# Patient Record
Sex: FEMALE | Race: AMERICAN INDIAN OR ALASKA NATIVE
[De-identification: names, ages, dates, MRNs, and addresses within clinical notes are randomized per-mention and may not be internally consistent; named-entity substitution may affect disease eponyms.]

---

## 2018-02-01 ENCOUNTER — HOSPITAL ENCOUNTER (OUTPATIENT)
Dept: HOSPITAL 5 - OR | Age: 53
Discharge: HOME | End: 2018-02-01
Attending: ORTHOPAEDIC SURGERY
Payer: OTHER GOVERNMENT

## 2018-02-01 VITALS — SYSTOLIC BLOOD PRESSURE: 126 MMHG | DIASTOLIC BLOOD PRESSURE: 77 MMHG

## 2018-02-01 DIAGNOSIS — Z98.890: ICD-10-CM

## 2018-02-01 DIAGNOSIS — M19.012: ICD-10-CM

## 2018-02-01 DIAGNOSIS — M75.52: ICD-10-CM

## 2018-02-01 DIAGNOSIS — Z79.82: ICD-10-CM

## 2018-02-01 DIAGNOSIS — M94.212: ICD-10-CM
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
## 2018-02-01 DIAGNOSIS — S43.492A: ICD-10-CM

## 2018-02-01 DIAGNOSIS — Z79.84: ICD-10-CM
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
## 2018-02-01 DIAGNOSIS — Z98.49: ICD-10-CM

## 2018-02-01 DIAGNOSIS — M06.80: ICD-10-CM

## 2018-02-01 DIAGNOSIS — E11.9: ICD-10-CM

## 2018-02-01 DIAGNOSIS — Z79.899: ICD-10-CM
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
## 2018-02-01 DIAGNOSIS — M75.42: ICD-10-CM

## 2018-02-01 DIAGNOSIS — Z90.710: ICD-10-CM

## 2018-02-01 DIAGNOSIS — I10: ICD-10-CM

## 2018-02-01 DIAGNOSIS — Z90.49: ICD-10-CM

## 2018-02-01 DIAGNOSIS — S46.012A: Primary | ICD-10-CM

## 2018-02-01 DIAGNOSIS — Z90.89: ICD-10-CM

## 2018-02-01 DIAGNOSIS — K21.9: ICD-10-CM

## 2018-02-01 PROCEDURE — 82962 GLUCOSE BLOOD TEST: CPT

## 2018-02-01 PROCEDURE — C1713 ANCHOR/SCREW BN/BN,TIS/BN: HCPCS

## 2018-02-01 NOTE — ANESTHESIA CONSULTATION
Anesthesia Consult and Med Hx


Date of service: 02/01/18





- Airway


Anesthetic Teeth Evaluation: Bridges


ROM Head & Neck: Adequate


Mental/Hyoid Distance: Adequate


Mallampati Class: Class II


Intubation Access Assessment: Probably Good





- Pulmonary Exam


CTA: Yes





- Cardiac Exam


Cardiac Exam: RRR





- Pre-Operative Health Status


ASA Pre-Surgery Classification: ASA3


Proposed Anesthetic Plan: General





- Pulmonary


Hx Smoking: No


Hx Sleep Apnea: Yes (DX SLEEP APNEA , NO CPAP USE.)





- Cardiovascular System


Hx Hypertension: Yes (X 6 YRS)





- Gastrointestinal


Hx Gastroesophageal Reflux Disease: Yes





- Endocrine


Hx Non-Insulin Dependent Diabetes: Yes





- Hematic


Hx Anemia: Yes





- Other Systems


Hx Cancer: No





- Additional Comments


Anesthesia Medical History Comments: h/o fibromyalgia, RA. ( Prednisone 5 mg - 

last took it 1 week ago)'

## 2018-02-01 NOTE — SHORT STAY SUMMARY
Short Stay Documentation





- Allergies and Medications


Current Medications: 


 Allergies





No Known Allergies Allergy (Verified 01/24/18 14:59)


 





 Home Medications











 Medication  Instructions  Recorded  Confirmed  Last Taken  Type


 


Aspirin [Lo-Dose Aspirin EC] 81 mg PO DAILY 01/24/18 01/24/18 1 Week Ago History





    ~01/25/18 


 


Empagliflozin [Jardiance] 10 mg PO DAILY 01/24/18 01/24/18 01/31/18 History


 


Esomeprazole Magnesium [NexIUM] 40 mg PO QDAY 01/24/18 01/24/18 01/31/18 History


 


Folic Acid 0.4 mg PO QDAY 01/24/18 01/24/18 02/01/18 06:00 History


 


Hydrochlorothiazide [HCTZ] 25 mg PO QDAY 01/24/18 01/24/18 02/01/18 06:00 

History


 


Methotrexate Sodium [Trexall] 20 mg PO QWEEK 01/24/18 01/24/18 01/31/18 History


 


Naproxen Sodium [Aleve] 220 mg PO PRN PRN 01/24/18 01/24/18 1 Week Ago History





    ~01/25/18 


 


Prednisone [predniSONE (Henny) ER 5 mg PO QDAY 01/24/18 01/24/18 1 Week Ago 

History





TAB]    ~01/25/18 


 


Pregabalin [Lyrica] 150 mg PO BID 01/24/18 01/24/18 02/01/18 06:00 History


 


metFORMIN [Glucophage] 500 mg PO BID 01/24/18 01/24/18 01/31/18 History








Active Medications





Cefazolin Sodium (Ancef/Sterile Water 2 Gm/20 Ml)  2 gm IV PREOP NR


   Stop: 02/01/18 12:00


Famotidine (Pepcid)  20 mg IV PREOP NR


   Stop: 02/01/18 12:00


   Last Admin: 02/01/18 08:32 Dose:  20 mg


Fentanyl (Sublimaze)  100 mcg IV ONCE NR


   Stop: 02/01/18 12:00


   Last Admin: 02/01/18 08:28 Dose:  50 mcg


Hydromorphone HCl (Dilaudid)  0.5 mg IV Q10MIN PRN


   PRN Reason: Pain , Severe (7-10)


   Stop: 02/01/18 13:00


Sodium Chloride (Nacl 0.9% 1000 Ml)  1,000 mls @ 75 mls/hr IV AS DIRECT ULISSES


   Last Admin: 02/01/18 08:15 Dose:  75 mls/hr


Midazolam HCl (Versed)  2 mg IV PREOP NR


   Stop: 02/01/18 23:59


   Last Admin: 02/01/18 08:28 Dose:  1 mg


Oxycodone/Acetaminophen (Percocet 5/325)  1 tab PO ONCE PRN


   PRN Reason: Pain, Moderate (4-6)


   Stop: 02/01/18 15:00











Short Stay Discharge Plan


Activity: advance as tolerated


Weight Bearing Status: Non-Weight Bearing (of left UE)


Diet: regular, advance as tolerated


Wound: keep clean and dry, change dressing, per your surgeon's advice


Durable Medical Equipment Needed Upon Discharge: other (SHOULDER BRACE WITH 

ABDUCTION PILLOW AT ALL TIMES)


Additional Instructions: 


FOLLOW DC instruction sheet


Follow PT instructions given today and take them to the PT place to follow 

small RC tear repair protocol


Follow up with: 


WALDO OJEDA MD [Staff Physician] - 14 Days


DORI WHEELER MD [Primary Care Provider] - 7 Days

## 2018-02-01 NOTE — OPERATIVE REPORT
AGE:  52.



SEX:  Female.



PREOPERATIVE DIAGNOSES:

1.  High-grade partial tear articular side rotator cuff, left shoulder,

supraspinatus tear.

2.  Impingement syndrome, left shoulder.

3.  Degenerative joint disease of the left shoulder region.



POSTOPERATIVE DIAGNOSES:

1.  Near full thickness tear of the anterior supraspinatus, nondisplaced left

shoulder.

2.  Impingement syndrome and subacromial bursitis, thickened subacromial bursa

with impingement syndrome, left shoulder region.

3.  Degenerative joint disease of the left shoulder joint.



COMPLICATIONS:  None.



BLOOD LOSS:  Minimal.



SURGEON: Misbah Tovar M.D.



ASSISTANT:  Sharad Dc, operative tech.



PROCEDURES PERFORMED:

1.  Left shoulder arthroscopic rotator cuff repair.

2.  Arthroscopic subacromial decompression.

3.  Arthroscopic limited debridement.



COMPLICATIONS:  None.



BRIEF HISTORY:  The patient is a 52-year-old lady with painful left shoulder,

failed conservative treatment, opted for surgical intervention.  Risks, benefit

discussed, informed consent obtained, brought to the hospital for the above

procedure.



DETAILS OF THE OPERATIVE REPORT:  The patient was taken to the operating, smooth

general endotracheal anesthesia, all the bony prominence were carefully padded,

placed in the beach chair position.  Left shoulder, left upper extremity prepped

and draped in sterile fashion.  Anatomical landmarks were drawn out.  The

patient was given antibiotic 2 grams Ancef half an hour before the procedure. 

Anatomical landmarks were drawn out.  Portal sites were marked.  Posterior

portal was created.  Arthroscope introduced.  Diagnostic arthroscopy was

performed.  Biceps tendon and anchor was intact.  Labrum had some fraying on the

anterior superior aspect.  Under direct visualization, anterosuperior portal was

created.  Limited debridement was performed.  There was near full thickness tear

of the anterior supraspinatus and this was tacked with 0 PDS suture using a

spinal needle.  Infraspinatus was intact.  There was some grade 3 chondromalacia

changes into the glenoid.  Labrum overall was intact.  Biceps was intact and

pristine.  Subscapularis was intact.  Axillary pouch was pristine, no loose

bodies.  Humeral head was fairly pristine other than mild arthritic changes in

about 5-10% area.  Once limited debridement performed and rotator cuff tear from

the articular side was tacked.  Arthroscope was then switched to the subacromial

space.  Under direct position, lateral portal was created.  Significant amount

of ______ bursitis and thickened bursa was noticed and using synovial resector,

bursectomy performed and subacromial decompression was performed.  Significant

soft tissue hypertrophy was then performed underneath the acromion and

arthroscope one was used to clean the undersurface of acromion and an

acromioplasty was performed using a bur in reverse fashion.  Once this was done,

we identified the tear on the articular side of the rotator cuff, also was

complete and extending to the intraarticular region.  The tacking stitch was

removed.  The ______ clean out and the footprint area was debrided and cleaned

and the tear was identified.  It was nondisplaced less than a centimeter tear. 

Lateral portal was widened and a cannula was inserted and then we used an

Arthrex FiberTape and rotator cuff tear was repaired in a SpeedFix fashion where

the suture which is a FiberTape was passed through the tear, full thickness tear

in a horizontal mattress fashion and anchored this laterally after preparing the

footprint.  Good bleeding surface was achieved and the footprint was prepared

and the repair was then anchored laterally with the self-punching SwiveLock

anchor.  Good fixation was achieved.  We tested it and sutures were then cut. 

Loose floating synovial tissues were removed and bone dust was also debrided and

suctioned out completely.  After the repair, the cuff moved as a unit.  We had

great reconstruction repair of the cuff.  It moved as a unit.  Fluid was then

evacuated and portal sites were closed with 3-0 nylon interrupted sutures. 

Dressing was done with Xeroform, 4 x 4s, ABD and a paper tape.  Abduction arm

sling was applied and the patient tolerated the procedure well, shifted to

recovery room in stable condition.  Sponge and needle count was correct.





DD: 02/01/2018 11:23

DT: 02/01/2018 13:07

JOB# [id number]  [id number]

SK/NTS